# Patient Record
(demographics unavailable — no encounter records)

---

## 2017-01-01 NOTE — DIAGNOSTIC IMAGING REPORT
CHEST AND ABDOMEN 2 VIEWS



HISTORY: vomiting.  intussusception surgery 3 weeks ago   



COMPARISON:  Chest and abdominal series 2017.



FINDINGS: The lungs are clear. The cardiomediastinal silhouette is within normal

limits.

There is no pneumoperitoneum or pneumatosis. Mildly distended gas and

stool-filled colon. There are few gas-filled mildly distended loops of small

bowel seen in the midabdomen.. No pathologic calcifications.



IMPRESSION:  

Mildly distended gas and stool-filled colon with a few mildly distended

gas-filled loops of small bowel. This nonspecific and could represent a

postoperative ileus. However, and large bowel obstruction from a the

intussusception could also have a similar appearance. Ultrasound is recommended

as follow-up. 









Electronically signed by:  Kuldip Vincent M.D.

2017 4:45 PM



Dictated Date/Time:  2017 3:52 PM

## 2017-01-01 NOTE — DIAGNOSTIC IMAGING REPORT
ABDOMEN LIMITED (US)



CLINICAL HISTORY: 6 months-old Male presenting with vomiting, h/o

intussusception, history of corrective surgery for intussusception. 



TECHNIQUE: Real-time grayscale and limited color Doppler ultrasound imaging of

the abdomen was performed limited to evaluation for intussusception. 



COMPARISON: 2017.



FINDINGS: 

No free fluid or hyperemic bowel loops. Intermittent visualization of a bowel

within bowel appearance in the right lower quadrant concerning for

intussusception. Additional nondilated fluid-filled bowel loops noted.



IMPRESSION:  

Findings suspicious for intermittent intussusception in the right lower

quadrant, which would be characteristic of an ileocolic intussusception. 



Preliminary findings were relayed to Dr. Patterson in the emergency room by Dr. Amaral at 1:52 AM on 2017.







Electronically signed by:  Naun Rucker M.D.

2017 6:38 AM



Dictated Date/Time:  2017 6:34 AM

## 2017-01-01 NOTE — DIAGNOSTIC IMAGING REPORT
KUB



CLINICAL HISTORY: Vomiting. History of intussusception.    



COMPARISON STUDY:  2017 



FINDINGS: There is gas and stool present within the colon extending from the

hepatic flexure to the rectum. There is no pathologic small bowel dilatation.



IMPRESSION:  No conventional radiographic evidence of bowel obstruction. 







Electronically signed by:  Pranay Armenta M.D.

2017 6:38 AM



Dictated Date/Time:  2017 6:37 AM

## 2017-01-01 NOTE — EMERGENCY ROOM VISIT NOTE
History


Report prepared by Zara:  Owen Hancock


Under the Supervision of:  Dr. Petros Foy M.D.


First contact with patient:  23:29


Chief Complaint:  VOMITING


Stated Complaint:  VOMITING, SMALL FEVER, COUGH





History of Present Illness


The patient is a 6M 9D year old male who presents to the Emergency Room with 

complaints of persistent vomiting starting earlier tonight. Additionally the 

mother states that five days ago he has been having on and off low grade fevers 

as well as a cough starting four days ago. She additionally notes that the 

patient had his flu shot five days ago. The mother notes that the patient has a 

history of intussusception, and he had surgery in the beginning of November. 

The mother states that the patient has been having bowel movements today 

without diarrhea, and she states that he does not have any blisters on his 

mouth. The mother states that the patient had Tylenol last night





   Source of History:  parent


   Onset:  earlier tonight


   Position:  other (global)


   Quality:  other (vomiting)


   Timing:  other (persistent)


   Associated Symptoms:  + fevers, + cough, No diarrhea





Review of Systems


See HPI for pertinent positives & negatives. A total of 10 systems reviewed and 

were otherwise negative.





Past Medical & Surgical


Medical Problems:


(1) High risk social situation


(2) Intussusception


(3) Maternal drug use complicating pregnancy in first trimester, antepartum


(4)  circumcision








Family History





Diabetes mellitus


FH: migraines


FHx: depression





Social History


Smoking Status:  Never Smoker


Drug Use:  none


Marital Status:  single


Housing Status:  lives with family


Occupation Status:  other





Current/Historical Medications


No Active Prescriptions or Reported Meds





Allergies


Coded Allergies:  


     No Known Allergies (Unverified , 12/10/17)





Physical Exam


Vital Signs











  Date Time  Temp Pulse Resp B/P (MAP) Pulse Ox O2 Delivery O2 Flow Rate FiO2


 


17 02:23  138 27  100   


 


17 00:59  128   97   


 


12/10/17 23:18 36.7 150 28  94 Room Air  











Physical Exam


General: Happy, well hydrated, interactive, no distress


Head: AT/NC, normal fontanel


Ear: Bilateral canals clear, normal TM


Mouth: Moist mucus membranes, no erythema, no tonsillar erythema/exudate/

swelling.  Normal tongue, lips and buccal mucosa


Eye: Pupils equal and reactive, normal conjunctiva


Nose: Bilateral rhinorrhea.


Neck: Non-tender, no adenopathy, no swelling


Lungs: Normal work of breathing, clear to auscultation


Cardiac: Regular rate and rhythm.  No murmurs, rubs, gallops appreciated


Abdomen: Well healed horizontal right abdominal incision. Soft, non-tender, non-

distended, normal bowel sounds.  No rebound, no guarding, no peritonitis


Back: No midline tenderness, no CVA tenderness


: Normal external genitalia


Skin: Normal turgor, no rashes, no bruising


Extremities: Normal strength, moving all extremities, normal pulses


Neuro: No neuro deficits, interacting normally for age





Medical Decision & Procedures


ER Provider


Diagnostic Interpretation:


Radiology results and stated below per my review and radiologist interpretation:








US OTHER- INTUSSUSCEPTION:





Initial images of the right lower quadrante without sonographically abnormal 

appearing bowel seen. Towards the end of imaging there is sonographic finding 

concerning for area of intussusception in the right lower quadrant which has 

similar appearance to the prior exam of 2017. This does appear to 

possibly resolve before the end of the exam as this cannot be reidentified 

during additional imaging of the right lower quadrant.





Laboratory Results











Test


  12/10/17


23:45


 


Influenza Type A Antigen


  Neg for Influ


A (NEG)


 


Influenza Type B Antigen


  Neg for Influ


B (NEG)


 


Respiratory Syncytial Virus


Antigen NEG for RSV


(NEG)








Laboratory results as reviewed by me.





ED Course


2329: The patient was evaluated in room A11. A complete history and physical 

exam was performed.





0205: I discussed the patient's case with Dr. Obed Coyne 

Pediatric Surgery, and he felt that discharge was reasonable with instructions 

for the patient's family and to continue for evaluation later today.





0208: Reevaluated the patient. Discussed results and discharge instructions: 

his mother verbalized understanding and agreement.   The patient is ready for 

discharge.





Medical Decision


6 month old male with episode intussusception 1 month ago requiring OR 

resolution.  Had second episode intussusception 2 weeks ago which resolved on 

own.  URI with low grade fever x few days with episode vomiting today.  Patient 

looks quite well, hungry and normal exam other than URI.  KUB unremarkable.  US 

with transient intussusception noted which could not be found at end of exam.  

Reviewed with Gen Surg who feel it is reasonable to keep planned follow up with 

them for later in the day.  Reviewed plan with mother who is comfortable with 

this.  Reviewed symptoms requiring return.  The patient is well hydrated, happy

, breathing comfortably and in no distress.  They are not septic and are stable 

at discharge.





Consults


Time Called:  1555


Consulting Physician:  Dr. Obed Coyne Pediatric Surgery


Returned Call:  0205


I discussed the patient's case with Dr. Obed Coyne Pediatric 

Surgery, and he felt that discharge was reasonable with instructions for the 

patient's family and to continue for evaluation later today.





Impression





 Primary Impression:  


 Upper respiratory infection


 Additional Impression:  


 Vomiting





Scribe Attestation


The scribe's documentation has been prepared under my direction and personally 

reviewed by me in its entirety. I confirm that the note above accurately 

reflects all work, treatment, procedures, and medical decision making performed 

by me.





Departure Information


Dispostion


Home / Self-Care





Prescriptions





No Active Prescriptions or Reported Meds





Referrals


Gaviota Howell CRNP (PCP)





Forms


HOME CARE DOCUMENTATION FORM,                                                 

               IMPORTANT VISIT INFORMATION





Patient Instructions


My [facility]





Additional Instructions





Keep appointment with surgery for later today.


Return if abdominal distention, increased vomiting, blood in stool, high fevers

, altered mental status or other concerns.


During feedings you may need to give him less more frequently.





Problem Qualifiers

## 2017-01-01 NOTE — NEWBORN ADMISSION
Delivery Information


Date of Service


2017.





[place] Information


 YOB: 2017


[place] Time of Birth:  2257


 Birth Weight:


3.094 kg        6lbs  13.1oz


[place] Length (height) inches:  19.50


Infant Head Circumference:  33.50


Sex:  Male


Race:  





Attendance at Delivery


Pediatrician ATTN at delivery?:  No





Method of Delivery


Delivery Type:  vaginal delivery





Gestational Age


Gestational Age:  41.2





Mother's Information


Demographics:  Age (30),  (2), Para (0 now 1), Living children (now 1)


Marital Status:  single


Family History:  + pertinent history of (Mom is ex-smoker (quit 2016). 

Maternal h/o migraines, anxiety and depression ( no meds), bulimia.)


Blood Type:  O, rh +


Group B Strep Status:  positive, appropriate ante partum abx (treated x 4)


VDRL:  Non-reactive


Rubella Status:  Immune


HbSAg:  negative


HIV:  unknown


Chlamydia:  negative


Gonorrhea:  negative


Maternal Anesthesia:  epidural


Additional Information:


MFM consulted due to bilateral hydronephrosis seen on Ultrasound 10/2016 in 

[place]. U/s [facility] 2017 mild hydronephrosis. U/s at [facility] 17 no 

hydronephrosis seen.





Delivery Care


Resuscitation:  stimulation/drying





APGAR Scoring


APGAR 1 Minute:  9


APGAR 5 minute:  10





Admission Physical


Physical Examination


General Appearance:  + normal appearance, + normal tone


Skin:  + pertinent finding (salmon patch nape)


Head/Neck:  + molding, + anterior fontanelle open & flat


Eyes:  + red reflex bilaterally


Ears, Nose, Throat:  No lip deformity, No palate deformity, No ear deformity


Thorax:  + normal appearance


Lungs:  + clear, No abnormal respiratory effort


Heart:  + regular rate and rhythm, + normal pulses (+2 femorals), No murmur


Abdomen:  + normal bowel sounds, + soft, No mass


Male Genitalia:  + normal male, No circumcision, No undescended testes


Trunk & Spine:  No abnormalities (None visible)


Extremities:  + clavicles intact, + normal hips, No hip click


Reflexes:  + normal jamie, + normal suck, + normal grasp


Anus:  patent





Impression


healthy, term, AGA





(1) High risk social situation


Late prenatal care - 16 weeks.  CYS involvement related to father's 13 yr child 

from a diff mother. CYS has concern about possible drug exposure in utero (see 

letter in chart). Maternal drug screen in 2016 + meth -amphetamines. Per 

mom last use at 8 weeks pregnancy - stopped once pregnancy confirmed. Mom 

seemed honest and open during conversation and denies any other drug use. 

 consulted.





(2) Maternal drug use complicating pregnancy in first trimester, antepartum


No maternal drug screen on admission. [facility] tox screen sent. Will monitor with 

Luz scores for QUINTON symptoms - not a candidate for early discharge.

## 2017-01-01 NOTE — DIAGNOSTIC IMAGING REPORT
CHEST ONE VIEW PORTABLE



CLINICAL HISTORY: 6 months-old Male presenting with cough, fevers. 



TECHNIQUE: Portable upright AP view of the chest was obtained.



COMPARISON: 2017.



FINDINGS:

Cardiomediastinal silhouette normal. Lungs and pleural spaces clear. Osseous

structures normal. Upper abdomen normal.



IMPRESSION:

1.  No acute cardiopulmonary disease.







Electronically signed by:  Naun Rucker M.D.

2017 6:34 AM



Dictated Date/Time:  2017 6:32 AM

## 2017-01-01 NOTE — DISCHARGE INSTRUCTIONS
Discharge Instructions


Date of Service


2017.





Birthday & Weight Information


Birthday:  17        


Time of Birth:  22:57


Birth Weight:  3.094 kg   6lbs  13.1oz





.





Discharge Weight Information


.


Discharge Weight:  2.880kg   6lbs 5.6oz


Weight Change (Kilograms):   -0.214         


Percent Weight Change:   -7.00 % 





.





Impression / Diagnosis


Impression / Diagnosis:  


(1) High risk social situation


(2) Maternal drug use complicating pregnancy in first trimester, antepartum


(3)  circumcision





 Blood Type











Test


  17


22:57


 


Cord Blood Type O POSITIVE 








.





[place] Supplemental Screening has been completed.





.





Procedures


Procedures Performed:  Circumcision





Hearing Screening


Hearing Test Results:  Left Ear Passed, Right Ear Referred





Hepatitis B Vaccine


1st Hepatitis B Vaccine Given:  2017





Instructions


Type of Feeding:  Breast


.





Feeding Instructions





If Breastfeeding:





* Feed baby at least 8-10 times in 24 hours.


* Babies most often nurse every 2-3 hours.  Time this from the beginning of the 

first feeding to the beginning of the next.


* Complete breastfeeding log record.  Take with you to your first visit with 

the baby's doctor.


* Call doctor if baby has less wet or soiled diapers than expected.





.





Baby's Office Visit


Follow-Up:  2017 (please call [facility] on  asap to schedule 

followup appointment)


Office Address and Phone Numbers:





[place] Office


[address] [id number]


Office Number:  [phone]





[place] Office


[address] [place] [address]


Office Number:  [phone]





Provider Instructions


.








SPECIAL CARE INSTRUCTIONS:





Bathing:





* Sponge baths every 2-3 days.  No tub baths until cord is completely healed. 

This usually takes 10-14 days.











Circumcision:





If your baby boy had a circumcision, please follow these care instructions.  

Apply A&D ointment or Vaseline and gauze square to penis with each diaper 

change for 2-3 days.  If gauze is not available, apply ointment directly to 

penis. Remove Vaseline gauze wrap 24 hours after circumcision if not already 

removed at time of discharge.  Wash circumcision with warm soapy water at least 

once a day at home.











Call your baby's doctor if:





* Temperature is greater that or equal to 100.4 degrees Fahrenheit or 38.0 

degrees Celsius.  Any fever up to the age of eight weeks needs to be evaluated 

by the physician.  Do not give any medications to infants without first talking 

with their physician.





* Yellow/green drainage, foul odor, increased redness or swelling of cord/

circumcision.





* Unable to awaken baby or excessive irritability.





* Your infant has any green vomiting.





* Diarrhea (frequent large watery stools or bloody/mucousy stools).





* Breathing difficulty (other than stuffy nose).





* Skin color changes.


 * blue spells


 * increased jaundice (yellow) that is not improving











Instructions noted above were prepared by Golden Neumann MD.





.

## 2017-01-01 NOTE — NEWBORN DISCHARGE
Delivery Information


Date of Service


2017.





[place] Information


 YOB: 2017


[place] Time of Birth:  22:57


Infant Head Circumference:  33.50


Sex:  Male


Race:  





Attendance at Delivery


Pediatrician ATTN at delivery?:  No





Method of Delivery


Delivery Type:  vaginal delivery





Gestational Age


Gestational Age:  41.2





Mother's Information


Demographics:  Age (30),  (2), Para (0 now 1), Living children (now 1)


Marital Status:  single


Family History:  + pertinent history of (Mom is ex-smoker (quit 2016). 

Maternal h/o migraines, anxiety and depression ( no meds), bulimia.)


Blood Type:  O, rh +


Group B Strep Status:  positive, appropriate ante partum abx (treated x 4)


VDRL:  Non-reactive


Rubella Status:  Immune


HbSAg:  negative


HIV:  unknown


Chlamydia:  negative


Gonorrhea:  negative


Maternal Anesthesia:  epidural





Delivery Care


Resuscitation:  stimulation/drying





APGAR Scoring


APGAR 1 Minute:  9


APGAR 5 minute:  10





Discharge Physical


Admission Date:  2017


Infant Head Circumference:  33.50


[place] Length (height) inches:  19.50


[place] Birth Weight:


3.094 kg        6lbs  13.1oz


Discharge Weight:


2.880kg         6lbs 5.6oz


Weight Change (Kilograms):  -0.214


Percent Weight Change:  -7.00


Discharge Date:  2017


Physical Examination


General Appearance:  + normal appearance, + normal tone


Skin:  + pertinent finding (salmon patch nape)


Head/Neck:  + molding, + anterior fontanelle open & flat


Eyes:  + red reflex bilaterally


Ears, Nose, Throat:  No lip deformity, No palate deformity, No ear deformity


Thorax:  + normal appearance


Lungs:  + clear, No abnormal respiratory effort


Heart:  + regular rate and rhythm, + normal pulses (+2 femorals), No murmur


Abdomen:  + normal bowel sounds, + soft, No mass


Male Genitalia:  + normal male, + circumcision, No undescended testes


Trunk & Spine:  No abnormalities (None visible)


Extremities:  + clavicles intact, + normal hips, No hip click


Reflexes:  + normal jamie, + normal suck, + normal grasp


Anus:  patent





 Abstinence Score


Most Recent Score:  2





Laboratory Results











Test


  17


22:57


 


Cord Blood Type O POSITIVE 


 


Direct Antiglobulin Test


([place]) NEGATIVE 


 


 


Direct Antiglobulin Test, Poly NEG 














Test


  17


09:45











Hearing Screening


Results:  Left Ear Passed, Right Ear Referred





Heart Disease Screening


Screen Result:  Negative





Impression & Diagnosis





(1) High risk social situation


Late prenatal care - 16 weeks.  CYS involvement related to father's 13 yr child 

from a diff mother. CYS has concern about possible drug exposure in utero (see 

letter in chart). Maternal drug screen in 2016 + meth -amphetamines. Per 

mom last use at 8 weeks pregnancy - stopped once pregnancy confirmed. Mom 

seemed honest and open during conversation and denies any other drug use. 

 consulted.





(2) Maternal drug use complicating pregnancy in first trimester, antepartum


No maternal drug screen on admission. [facility] tox screen sent. Will monitor with 

Luz scores for QUINTON symptoms - not a candidate for early discharge. 





6/3


chart reviewed.  as noted, no maternal or infant UDS screen done, but meconium 

drug screen sent.  Luz scores not validated for non-opioids, but will 

continue to monitor as a general screen for other types of withdrawal.  for 

circ today.  likely discharge tomorrow.  CYS to be notified at discharge per 

case management note, but baby will not be placed.














(3)  circumcision


Status:  Resolved





Hepatitis B Vaccine


Hepatitis B Vaccine Given On:  2017





Discharge Comments


Hospital Course:  


(1) High risk social situation


(2) Maternal drug use complicating pregnancy in first trimester, antepartum


(3)  circumcision


Type of Feeding:  Breast


Follow-Up Date:  2017 (please call [facility] on  asa to 

schedule followup appointment)

## 2017-01-01 NOTE — DIAGNOSTIC IMAGING REPORT
ABDOMEN LIMITED (US)



CLINICAL HISTORY: eval for intussusception. Abdominal distention.



COMPARISON STUDY:  Abdominal ultrasound 2017.



FINDINGS: There is evidence for intussusception involving loops of bowel within

the right lower quadrant. Trace fluid surrounding the liver and right kidney.



IMPRESSION:  There is again noted intussusception involving loops of bowel

within the right lower quadrant. It is difficult to determine whether these

represent small or large bowel.









Electronically signed by:  Kuldip Vincent M.D.

2017 5:01 PM



Dictated Date/Time:  2017 4:56 PM

## 2017-01-01 NOTE — EMERGENCY ROOM VISIT NOTE
History


Report prepared by Zara:  Afia Gonzalez


Under the Supervision of:  Dr. Rob Dunne M.D.


First contact with patient:  14:51


Chief Complaint:  VOMITING


Stated Complaint:  VOMITING





History of Present Illness


The patient is a 5M 25D old male who presents to the Emergency Room with 

complaints of worsening vomiting starting yesterday. The patient's mother 

reports that the patient was here on  and had an intussusception. 

She reports that he was sent to [facility] and had 3 enemas with no 

help. She states that they then tried to do it laparoscopically and had to open 

him up due to how severe it was. The mother reports that within a couple days 

of being home he was completely himself. She states that he started spitting up 

last night and then today while napping, started to vomit. She reports that 

right after she took him to the bath where he vomited several more times. She 

states that these episodes were large amounts of vomit. She reports that the 

last vomit he had was mainly mucus. She notes that she tried to feel his 

stomach and notes that he just laughed like he was being tickled. The patient's 

mother reports that she has been feeding him more bottles and baby food this 

past week. She complains of his BM being light green. She notes his shots are 

up to date and denies him being around anyone who has been sick with vomiting. 





The parent denies LOC, fevers, chills, visual complaints, neck pain/limited ROM

, difficulty with swallowing, breathing difficulties, wound problems, abdominal 

pain, melena, hematochezia, lymphadenopathy, rash, joint tenderness/swelling, 

or other complaints.





   Source of History:  parent


   Onset:  yesterday


   Position:  other (global)


   Quality:  other (global)


   Timing:  worsening


Note:


The mother complains of the patient having light green poop.





Review of Systems


See HPI for pertinent positives and negatives.  A total of ten systems were 

reviewed and were otherwise negative.





Past Medical & Surgical


Medical Problems:


(1) High risk social situation


(2) Intussusception


(3) Maternal drug use complicating pregnancy in first trimester, antepartum


(4)  circumcision








Family History





Diabetes mellitus


FH: migraines


FHx: depression





Social History


Smoking Status:  Never Smoker


Drug Use:  none


Marital Status:  single


Housing Status:  lives with family


Occupation Status:  other





Current/Historical Medications


No Active Prescriptions or Reported Meds





Allergies


Coded Allergies:  


     No Known Allergies (Unverified , 17)





Physical Exam


Vital Signs











  Date Time  Temp Pulse Resp B/P (MAP) Pulse Ox O2 Delivery O2 Flow Rate FiO2


 


17 18:56  141 24  97 Room Air  


 


17 17:36  146 26  99 Room Air  


 


17 14:42 36.1 128 24  98 Room Air  











Physical Exam


GENERAL: Awake, alert, well appearing, nontoxic, in no distress


HEAD: Atraumatic. No edema. Soft fontanelle.


EYES: Normal conjunctiva. Sclera non-icteric.


EARS: Right TM normal. Left TM normal.


NOSE: Unremarkable.


OROPHARYNX: Lips, tongue, and mucosa unremarkable. No erythema, exudate, 

ulcerations.


NECK: Supple. No nuchal rigidity. FROM. No adenopathy.


RESPIRATORY: CTA bilaterally


CARDIAC: Regular rate, normal rhythm.


ABDOMEN: Soft, non distended. No tenderness to palpation. No hernias. LLQ 

incision is CDI. 


BACK: Unremarkable.


: Unremarkable. 


SKIN: No rash or jaundice noted. No desquamation.


LYMPH: No adenopathy.


MUSCULOSKELETAL: No edema or ecchymosis. No joint swelling. 


NEURO: Normal sensorium. No sensory or motor deficits noted.





Medical Decision & Procedures


ER Provider


Diagnostic Interpretation:


Radiology results as stated below per my review and radiologist interpretation: 





CHEST AND ABDOMEN 2 VIEWS





HISTORY: vomiting.  intussusception surgery 3 weeks ago   





COMPARISON:  Chest and abdominal series 2017.





FINDINGS: The lungs are clear. The cardiomediastinal silhouette is within normal


limits.


There is no pneumoperitoneum or pneumatosis. Mildly distended gas and


stool-filled colon. There are few gas-filled mildly distended loops of small


bowel seen in the midabdomen.. No pathologic calcifications.





IMPRESSION:  


Mildly distended gas and stool-filled colon with a few mildly distended


gas-filled loops of small bowel. This nonspecific and could represent a


postoperative ileus. However, and large bowel obstruction from a the


intussusception could also have a similar appearance. Ultrasound is recommended


as follow-up. 














Electronically signed by:  Kuldip Vincent M.D.


2017 4:45 PM





Dictated Date/Time:  2017 3:52 PM








ABDOMEN LIMITED (US)





CLINICAL HISTORY: eval for intussusception. Abdominal distention.





COMPARISON STUDY:  Abdominal ultrasound 2017.





FINDINGS: There is evidence for intussusception involving loops of bowel within


the right lower quadrant. Trace fluid surrounding the liver and right kidney.





IMPRESSION:  There is again noted intussusception involving loops of bowel


within the right lower quadrant. It is difficult to determine whether these


represent small or large bowel.














Electronically signed by:  Kuldip Vincent M.D.


2017 5:01 PM





Dictated Date/Time:  2017 4:56 PM





Laboratory Results


17 17:30








Red Blood Count 4.29, Mean Corpuscular Volume 79.3, Mean Corpuscular Hemoglobin 

27.3, Mean Corpuscular Hemoglobin Concent 34.4, Mean Platelet Volume 9.7, 

Neutrophils (%) (Auto) 49.0, Lymphocytes (%) (Auto) 42.8, Monocytes (%) (Auto) 

6.6, Eosinophils (%) (Auto) 1.0, Basophils (%) (Auto) 0.2, Neutrophils # (Auto) 

9.02, Lymphocytes # (Auto) 7.87, Monocytes # (Auto) 1.21, Eosinophils # (Auto) 

0.18, Basophils # (Auto) 0.04





17 17:30

















Test


  17


17:30


 


White Blood Count


  18.39 K/uL


(5.0-19.5)


 


Red Blood Count


  4.29 M/uL


(3.1-4.5)


 


Hemoglobin


  11.7 g/dL


(9.5-13.5)


 


Hematocrit 34.0 % (29-41) 


 


Mean Corpuscular Volume


  79.3 fL


()


 


Mean Corpuscular Hemoglobin


  27.3 pg


(25-35)


 


Mean Corpuscular Hemoglobin


Concent 34.4 g/dl


(30-36)


 


Platelet Count


  543 K/uL


(130-400)


 


Mean Platelet Volume


  9.7 fL


(7.4-10.4)


 


Neutrophils (%) (Auto) 49.0 % 


 


Lymphocytes (%) (Auto) 42.8 % 


 


Monocytes (%) (Auto) 6.6 % 


 


Eosinophils (%) (Auto) 1.0 % 


 


Basophils (%) (Auto) 0.2 % 


 


Neutrophils # (Auto)


  9.02 K/uL


(1.0-9.0)


 


Lymphocytes # (Auto)


  7.87 K/uL


(2.5-16.5)


 


Monocytes # (Auto)


  1.21 K/uL


(0-1.8)


 


Eosinophils # (Auto)


  0.18 K/uL


(0-1.1)


 


Basophils # (Auto)


  0.04 K/uL


(0-0.4)


 


RDW Standard Deviation


  40.0 fL


(36.4-46.3)


 


RDW Coefficient of Variation


  14.1 %


(11.5-14.5)


 


Immature Granulocyte % (Auto) 0.4 % 


 


Immature Granulocyte # (Auto)


  0.07 K/uL


(0.00-0.02)


 


Anion Gap


  13.0 mmol/L


(3-11)


 


Estimated GFR (


American)  


 


 


Estimated GFR (Non-


American  


 


 


BUN/Creatinine Ratio 50.0 


 


Calcium Level


  9.9 mg/dl


(9.0-11.0)





Laboratory results reviewed by me





Medications Administered











 Medications


  (Trade)  Dose


 Ordered  Sig/Blanche


 Route  Start Time


 Stop Time Status Last Admin


Dose Admin


 


 Sodium Chloride  1,000 ml @ 


 25 mls/hr  Q24H STAT


 IV  17 17:11


 17 17:10  17 17:35


25 MLS/HR











ED Course


1456: The patient was evaluated in room C6. A complete history and physical 

exam was performed.





1631: I went to revaluate the patient and he was getting his x-ray and 

ultrasound done. 





1651: I reevaluated the patient and he is doing well and updated the mom on his 

test results.





1711: Ordered NSS 1000 ml @ 25 mls/hr IV.





1714: I reevaluated the patient and he is doing okay.





1715: Discussed the patient's case with Dr. Clay. The patient will be 

transferred and evaluated for further treatment and disposition.





1827: I reevaluated the patient and he was doing okay. I updated his family on 

when the ambulance will be arriving.





Medical Decision


Triage Nursing notes reviewed.


The patient's presentation and history were concerning for vomiting and recent 

surgery.





Etiologies such as ileus, intussusception, obstruction, gastroenteritis, food 

borne illness, pancreatitis, appendicitis,biliary pathology, toxicologic as 

well as others were entertained.    








The baby was evaluated.  Clinically he looks fantastic.  His surgical wound is 

healing very well.  He had no significant distention or tenderness.  Bowel 

sounds were quiet but they were present.  X-ray imaging was performed and was 

abnormal as above.  Ultrasound imaging was also performed.  The child was 

reassessed.  He was not having any vomiting.  He did have several episodes of 

passing gas from below.





The x-ray imaging was very concerning despite his good clinical appearance.  An 

ultrasound did reveal findings concerning for intussusception.  An IV was 

established.  Blood work was rather unremarkable.  Family was educated.  I 

contacted [facility] pediatric surgery, Dr. Clay and discussed the case.  He 

accepted the patient in transfer.  Imaging and paperwork were compiled.  

Interfacility medical command done by me.  Transport was arranged.  Family 

consented to transfer.  The patient was monitored.  He was kept nothing by 

mouth and hydrated with normal saline.  He was transferred for further 

management with findings concerning for intussusception.





Medication Reconcilliation


Current Medication List:  was personally reviewed by me





Consults


Time Called:  171


Consulting Physician:  Dr. Clay


Returned Call:  [phone]


Discussed the patient's case with Dr. Clay. The patient will be transferred 

and evaluated for further treatment and disposition.





Impression





 Primary Impression:  


 Intussusception


 Additional Impression:  


 Vomiting





Scribe Attestation


The scribe's documentation has been prepared under my direction and personally 

reviewed by me in its entirety. I confirm that the note above accurately 

reflects all work, treatment, procedures, and medical decision making performed 

by me.





Departure Information


Dispostion


Transfer Acute Care Facility





Prescriptions





No Active Prescriptions or Reported Meds





Referrals


Lacy Crawley P.A. (PCP)





Patient Instructions


[facility]





Problem Qualifiers

## 2017-01-01 NOTE — PROCEDURE NOTE
Circumcision Procedure Note


Date of Service:


Krishan 3, 2017.


Permit:


Time out completed. Risks benefits of circumcision reviewed with parent who 

requests circumcision. Signed permit on the chart.


 


Dorsal Penile Nerve block: 


Alcohol prep. Lidocaine 1% local 0.5ml injected at base of penis x 2.


 


Circumcision:  


Betadine prep, sterile drape, gomco circumcision done in the usual fashion. EBL 

minimal 


 


Vaseline gauze sterile dressing applied.
